# Patient Record
Sex: FEMALE | Race: WHITE
[De-identification: names, ages, dates, MRNs, and addresses within clinical notes are randomized per-mention and may not be internally consistent; named-entity substitution may affect disease eponyms.]

---

## 2017-02-05 ENCOUNTER — HOSPITAL ENCOUNTER (OUTPATIENT)
Dept: HOSPITAL 80 - FIMAGING | Age: 77
End: 2017-02-05
Attending: ORTHOPAEDIC SURGERY
Payer: COMMERCIAL

## 2017-02-05 DIAGNOSIS — M75.81: Primary | ICD-10-CM

## 2017-02-05 DIAGNOSIS — M75.51: ICD-10-CM

## 2017-02-05 DIAGNOSIS — M75.21: ICD-10-CM

## 2017-02-06 NOTE — MR
MRI upper extremity, right shoulder



History: Right shoulder pain. ICD-10 code M25.511.



Technique: MRI was performed of the right shoulder using a 3 [employer] MRI system. Oblique coronal, obliq
ue sagittal, and axial images were obtained with standard imaging sequences.



Findings:



Acromioclavicular Region: Mild degenerative change is seen at the acromioclavicular joint. There is a
n anterior curve and lateral downslope to the acromion. There is a mild subacromial fluid collection.




Rotator Cuff: There is abnormal signal intensity and attenuation in the distal supraspinatus tendon. 
Bursal surface and undersurface fraying and attenuation are seen in the distal supraspinatus tendon. 
This is more predominant in the distal anterior insertion with approximately 25% attenuation. Subcort
ical cyst and bone marrow edema are seen in the anterior greater tuberosity. There is mild abnormal s
ignal intensity and attenuation in the distal infraspinatus tendon. Teres minor is unremarkable. Ther
e is abnormal signal intensity and attenuation in the distal superior fibers of the subscapularis ten
don.



Biceps tendon: Abnormal signal intensity in attenuation in the long head biceps tendon at the superio
r aspect of the bicipital groove and as it extends intra-articular. Biceps anchor is intact.



Glenohumeral Joint: There is fraying and partial tear in the posterosuperior labrum in the 10 to 12 o
'clock position. There is mild cartilage thinning in the anterior glenoid with subcortical bone marro
w edema. Smooth defect is seen in the superior aspect of the anterior labrum, which could be extensio
n of a superior labral tear adjacent normal variant sublabral foramen. No significant joint effusion.




General: No evidence for Hill-Sachs deformity. No evidence for axillary lymphadenopathy.



Impression: 

1. Mild to moderate tendinopathy and partial tear distal supraspinatus tendon, more predominant at th
e distal anterior insertion. Mild tendinopathy and partial tear infraspinatus tendon. Multiple subacr
omial bursitis.

2. Moderate tendinopathy and partial tear of the long head biceps tendon at the superior aspect of th
e bicipital groove and intra-articular.

3. Mild tendinopathy and partial tear distal superior fibers subscapularis tendon.

4. Fraying and partial tear posterior superior labrum. Probable adjacent normal variant sublabral for
amen. Minimal early degenerative change glenohumeral joint.

5. Mild degenerative change acromioclavicular joint. Anterior curve and lateral downslope to the acro
mion.

## 2018-03-15 ENCOUNTER — HOSPITAL ENCOUNTER (OUTPATIENT)
Dept: HOSPITAL 80 - FIMAGING | Age: 78
End: 2018-03-15
Attending: INTERNAL MEDICINE
Payer: COMMERCIAL

## 2018-03-15 DIAGNOSIS — Z78.0: ICD-10-CM

## 2018-03-15 DIAGNOSIS — M81.0: ICD-10-CM

## 2018-03-15 DIAGNOSIS — Z13.820: Primary | ICD-10-CM
